# Patient Record
Sex: MALE | Race: BLACK OR AFRICAN AMERICAN | Employment: UNEMPLOYED | URBAN - METROPOLITAN AREA
[De-identification: names, ages, dates, MRNs, and addresses within clinical notes are randomized per-mention and may not be internally consistent; named-entity substitution may affect disease eponyms.]

---

## 2024-03-26 ENCOUNTER — APPOINTMENT (OUTPATIENT)
Dept: GENERAL RADIOLOGY | Age: 29
End: 2024-03-26

## 2024-03-26 ENCOUNTER — APPOINTMENT (OUTPATIENT)
Dept: CT IMAGING | Age: 29
End: 2024-03-26

## 2024-03-26 ENCOUNTER — HOSPITAL ENCOUNTER (EMERGENCY)
Age: 29
Discharge: HOME | End: 2024-03-26

## 2024-03-26 VITALS
TEMPERATURE: 98.1 F | RESPIRATION RATE: 17 BRPM | OXYGEN SATURATION: 99 % | WEIGHT: 187.39 LBS | HEART RATE: 79 BPM | SYSTOLIC BLOOD PRESSURE: 135 MMHG | DIASTOLIC BLOOD PRESSURE: 87 MMHG

## 2024-03-26 DIAGNOSIS — S70.311A ABRASION OF RIGHT THIGH, INITIAL ENCOUNTER: ICD-10-CM

## 2024-03-26 DIAGNOSIS — S80.12XA CONTUSION OF LEFT LOWER LEG, INITIAL ENCOUNTER: ICD-10-CM

## 2024-03-26 DIAGNOSIS — S81.812A LACERATION OF LEFT LOWER EXTREMITY, INITIAL ENCOUNTER: Primary | ICD-10-CM

## 2024-03-26 PROCEDURE — 73700 CT LOWER EXTREMITY W/O DYE: CPT

## 2024-03-26 PROCEDURE — 6370000000 HC RX 637 (ALT 250 FOR IP): Performed by: PHYSICIAN ASSISTANT

## 2024-03-26 PROCEDURE — 99284 EMERGENCY DEPT VISIT MOD MDM: CPT

## 2024-03-26 PROCEDURE — 73590 X-RAY EXAM OF LOWER LEG: CPT

## 2024-03-26 PROCEDURE — 73552 X-RAY EXAM OF FEMUR 2/>: CPT

## 2024-03-26 RX ORDER — NAPROXEN 250 MG/1
500 TABLET ORAL ONCE
Status: COMPLETED | OUTPATIENT
Start: 2024-03-26 | End: 2024-03-26

## 2024-03-26 RX ORDER — ACETAMINOPHEN 325 MG/1
650 TABLET ORAL ONCE
Status: COMPLETED | OUTPATIENT
Start: 2024-03-26 | End: 2024-03-26

## 2024-03-26 RX ORDER — IBUPROFEN 200 MG
TABLET ORAL ONCE
Status: COMPLETED | OUTPATIENT
Start: 2024-03-26 | End: 2024-03-26

## 2024-03-26 RX ORDER — IBUPROFEN 200 MG
TABLET ORAL
Qty: 1 EACH | Refills: 0 | Status: SHIPPED | OUTPATIENT
Start: 2024-03-26

## 2024-03-26 RX ADMIN — NAPROXEN SODIUM 500 MG: 250 TABLET ORAL at 20:20

## 2024-03-26 RX ADMIN — BACITRACIN ZINC, NEOMYCIN, POLYMYXIN B SULFAT: 5000; 3.5; 4 OINTMENT TOPICAL at 20:21

## 2024-03-26 RX ADMIN — ACETAMINOPHEN 325MG 650 MG: 325 TABLET ORAL at 20:21

## 2024-03-26 ASSESSMENT — PAIN SCALES - GENERAL: PAINLEVEL_OUTOF10: 7

## 2024-03-27 ASSESSMENT — ENCOUNTER SYMPTOMS
BACK PAIN: 0
COLOR CHANGE: 0

## 2024-03-27 NOTE — ED NOTES
D/C: Order noted for d/c. Pt confirmed d/c paperwork have correct name. Discharge and education instructions reviewed with patient. Teach-back successful.  Pt verbalized understanding. Pt denied questions at this time. No acute distress noted. Patient instructed to follow-up as noted - return to emergency department if symptoms worsen. Patient verbalized understanding. Discharged per EDMD with discharge instructions. Pt discharged to private vehicle. Patient stable upon departure. Thanked patient for choosing [facility] for care.

## 2024-03-27 NOTE — ED PROVIDER NOTES
No osseous erosions.     1. Normal CT of the pelvis with attention to the right femoral neck.     XR TIBIA FIBULA LEFT (2 VIEWS)    Result Date: 3/26/2024  EXAMINATION: 4 XRAY VIEWS OF THE LEFT TIBIA AND FIBULA 3/26/2024 8:12 pm COMPARISON: None HISTORY: ORDERING SYSTEM PROVIDED HISTORY: trauma, injury TECHNOLOGIST PROVIDED HISTORY: Reason for exam:->trauma, injury Reason for Exam: trauma, injury FINDINGS: The tibia and fibula are intact.  No acute fracture or dislocation is seen. The joint spaces are intact.  The soft tissues unremarkable.     No abnormality seen.     XR FEMUR RIGHT (MIN 2 VIEWS)    Result Date: 3/26/2024  EXAMINATION: 4 XRAY VIEWS OF THE RIGHT FEMUR 3/26/2024 8:12 pm COMPARISON: None HISTORY: ORDERING SYSTEM PROVIDED HISTORY: trauma, injury TECHNOLOGIST PROVIDED HISTORY: Reason for exam:->trauma, injury Reason for Exam: trauma, injury FINDINGS: There is mild joint space narrowing in the right hip.  The right hip appears slightly rotated with a questionable mild linear area of sclerosis along the base of the right femoral neck which is not seen well on the frogleg view. No obvious displaced bony fragment is seen.  The growth plates are intact. The right hemipelvis is intact.  The distal femur is unremarkable.     Slight rotation of the right femoral femur with questionable minimal linear sclerosis along the base of the right femoral neck which may just represent a normal growth line or less likely an occult injury to the area.  If the patient is focally symptomatic in the area of the right hip.  Suggest follow-up views of the hip or CT correlation. Mild joint space narrowing in the right hip.       No results found.    PROCEDURES   Unless otherwise noted below, none     Procedures    CRITICAL CARE TIME (.cctime)   I performed a total Critical Care time of 0 minutes, excluding separately reportable procedures.  There was a high probability of clinically significant/life threatening deterioration in

## 2025-04-27 ENCOUNTER — HOSPITAL ENCOUNTER (EMERGENCY)
Age: 30
Discharge: HOME | End: 2025-04-28

## 2025-04-27 DIAGNOSIS — K64.4 EXTERNAL HEMORRHOID: Primary | ICD-10-CM

## 2025-04-27 PROCEDURE — 99283 EMERGENCY DEPT VISIT LOW MDM: CPT

## 2025-04-27 ASSESSMENT — PAIN DESCRIPTION - LOCATION: LOCATION: RECTUM

## 2025-04-27 ASSESSMENT — PAIN - FUNCTIONAL ASSESSMENT: PAIN_FUNCTIONAL_ASSESSMENT: 0-10

## 2025-04-27 ASSESSMENT — PAIN SCALES - GENERAL: PAINLEVEL_OUTOF10: 10

## 2025-04-28 VITALS
WEIGHT: 210.54 LBS | SYSTOLIC BLOOD PRESSURE: 128 MMHG | HEART RATE: 64 BPM | RESPIRATION RATE: 16 BRPM | BODY MASS INDEX: 26.18 KG/M2 | TEMPERATURE: 98.2 F | OXYGEN SATURATION: 99 % | DIASTOLIC BLOOD PRESSURE: 86 MMHG | HEIGHT: 75 IN

## 2025-04-28 PROCEDURE — 6370000000 HC RX 637 (ALT 250 FOR IP): Performed by: PHYSICIAN ASSISTANT

## 2025-04-28 RX ORDER — IBUPROFEN 600 MG/1
600 TABLET, FILM COATED ORAL EVERY 8 HOURS PRN
Qty: 30 TABLET | Refills: 0 | Status: SHIPPED | OUTPATIENT
Start: 2025-04-28 | End: 2025-05-08

## 2025-04-28 RX ORDER — ACETAMINOPHEN 325 MG/1
650 TABLET ORAL ONCE
Status: COMPLETED | OUTPATIENT
Start: 2025-04-28 | End: 2025-04-28

## 2025-04-28 RX ORDER — ACETAMINOPHEN 500 MG
500 TABLET ORAL 4 TIMES DAILY PRN
Qty: 360 TABLET | Refills: 1 | Status: SHIPPED | OUTPATIENT
Start: 2025-04-28

## 2025-04-28 RX ORDER — LIDOCAINE HYDROCHLORIDE AND HYDROCORTISONE ACETATE 30; 5 MG/G; MG/G
1 CREAM RECTAL 2 TIMES DAILY PRN
Qty: 1 KIT | Refills: 0 | Status: SHIPPED | OUTPATIENT
Start: 2025-04-28 | End: 2025-05-05

## 2025-04-28 RX ORDER — DOCUSATE SODIUM 100 MG/1
100 CAPSULE, LIQUID FILLED ORAL 2 TIMES DAILY
Qty: 60 CAPSULE | Refills: 0 | Status: SHIPPED | OUTPATIENT
Start: 2025-04-28 | End: 2025-05-28

## 2025-04-28 RX ADMIN — ACETAMINOPHEN 650 MG: 325 TABLET ORAL at 01:48

## 2025-04-28 ASSESSMENT — PAIN - FUNCTIONAL ASSESSMENT: PAIN_FUNCTIONAL_ASSESSMENT: 0-10

## 2025-04-28 ASSESSMENT — PAIN SCALES - GENERAL
PAINLEVEL_OUTOF10: 8
PAINLEVEL_OUTOF10: 4

## 2025-04-28 NOTE — DISCHARGE INSTRUCTIONS
Take ibuprofen instead of naproxen. This may give you better relief.   ---------------  Sitz baths -- Sitz baths are an intuitive topical treatment for acute flare-ups of hemorrhoids to reduce inflammation and edema and relax the sphincter muscles. Patients with significant hemorrhoid disease tend to have elevated sphincter tone. Manometric studies have confirmed that application of moist heat to the perianal area can lower the internal sphincter and anal canal pressures [32]. Sitz baths can relieve irritation and pruritus as well as spasm of the anal sphincter muscles. They should be used with warm, rather than cold, water two to three times per day [33]. A commercially available portable bowl allows for use at the workplace.

## 2025-04-28 NOTE — ED TRIAGE NOTES
Patient presents with rectal pain due to a hemorrhoid for the past 3 days. He denies any rectal bleeding.

## 2025-05-03 NOTE — ED PROVIDER NOTES
[facility] EMERGENCY DEPARTMENT  EMERGENCY DEPARTMENT ENCOUNTER        Pt Name: Daljit Combs  MRN: [id number]  Birthdate 1995  Date of evaluation: 4/27/2025  Provider: Priscilla Boswell PA-C  PCP: No primary care provider on file.  Note Started: 8:15 PM EDT 5/2/25      LILIYA. I have evaluated this patient.        CHIEF COMPLAINT       Chief Complaint   Patient presents with    Rectal Pain     Patient presents with pain due to hemorrhoid that has been present for the past 3 days. He strained and now he has pain.        HISTORY OF PRESENT ILLNESS: 1 or more Elements     History From:   patient            Chief Complaint: rectal pain    Daljit Combs is a 30 y.o. male who presents to the emergency department with complaint of rectal pain has been present over the past 3 days.  He states that he had been quite constipated and has been for several months.  He strain 3 days ago and is rectal pain worsen.  It is worse with palpation.  He asked the pharmacy what he should put on this and took something over-the-counter.  He states that he has family history of hemorrhoids.  His family has had to have hemorrhoidectomy.  He denies any fevers or chills.  He denies any abdominal pain.  Denies diarrhea, hematochezia, melena.    Nursing Notes were all reviewed and agreed with or any disagreements were addressed in the HPI.    REVIEW OF SYSTEMS :      Review of Systems   All other systems reviewed and are negative.      Positives and Pertinent negatives as per HPI.     SURGICAL HISTORY   No past surgical history on file.    CURRENTMEDICATIONS       Discharge Medication List as of 4/28/2025  1:45 AM        CONTINUE these medications which have NOT CHANGED    Details   neomycin-bacitracin-polymyxin (NEOSPORIN) [id number] ointment Apply topically 4 times daily., Disp-1 each, R-0, Print             ALLERGIES     Patient has no known allergies.    FAMILYHISTORY     No family history on file.     SOCIAL HISTORY       Social